# Patient Record
Sex: FEMALE | Race: WHITE | NOT HISPANIC OR LATINO | Employment: FULL TIME | URBAN - METROPOLITAN AREA
[De-identification: names, ages, dates, MRNs, and addresses within clinical notes are randomized per-mention and may not be internally consistent; named-entity substitution may affect disease eponyms.]

---

## 2024-12-16 ENCOUNTER — TELEPHONE (OUTPATIENT)
Dept: URGENT CARE | Facility: CLINIC | Age: 64
End: 2024-12-16
Payer: COMMERCIAL

## 2024-12-16 ENCOUNTER — OFFICE VISIT (OUTPATIENT)
Dept: URGENT CARE | Facility: CLINIC | Age: 64
End: 2024-12-16
Payer: COMMERCIAL

## 2024-12-16 VITALS
BODY MASS INDEX: 24.11 KG/M2 | WEIGHT: 150 LBS | TEMPERATURE: 98.2 F | DIASTOLIC BLOOD PRESSURE: 86 MMHG | HEART RATE: 79 BPM | OXYGEN SATURATION: 97 % | RESPIRATION RATE: 16 BRPM | SYSTOLIC BLOOD PRESSURE: 153 MMHG | HEIGHT: 66 IN

## 2024-12-16 DIAGNOSIS — J01.90 ACUTE RHINOSINUSITIS: Primary | ICD-10-CM

## 2024-12-16 DIAGNOSIS — J02.9 ACUTE PHARYNGITIS, UNSPECIFIED ETIOLOGY: ICD-10-CM

## 2024-12-16 LAB — POC RAPID STREP: NEGATIVE

## 2024-12-16 PROCEDURE — 99212 OFFICE O/P EST SF 10 MIN: CPT | Performed by: PHYSICIAN ASSISTANT

## 2024-12-16 PROCEDURE — 87880 STREP A ASSAY W/OPTIC: CPT | Performed by: PHYSICIAN ASSISTANT

## 2024-12-16 RX ORDER — DEXTROMETHORPHAN HYDROBROMIDE, GUAIFENESIN AND PSEUDOEPHEDRINE HYDROCHLORIDE 15; 400; 60 MG/1; MG/1; MG/1
1 TABLET ORAL 3 TIMES DAILY
Qty: 21 TABLET | Refills: 0 | Status: SHIPPED | OUTPATIENT
Start: 2024-12-16 | End: 2024-12-23

## 2024-12-16 ASSESSMENT — VISUAL ACUITY: OU: 1

## 2024-12-16 NOTE — TELEPHONE ENCOUNTER
Result Communication    No results found from the In Basket message.    5:11 PM      Results were successfully communicated with the patient and they acknowledged their understanding.

## 2024-12-16 NOTE — PROGRESS NOTES
[facility]RG[facility]   RAFAL NOTE:      Name: Tish Cherry, 64 y.o.    CSN:[id number]   MRN:[id number]    PCP: No primary care provider on file.    ALL:  No Known Allergies    History:    Chief Complaint: Sore Throat (Sore throat, and body aches for 2 days. )    Encounter Date: 12/16/2024      HPI: The history was obtained from the patient. Tish is a 64 y.o. female, who presents with a chief complaint of Sore Throat (Sore throat, and body aches for 2 days. ) exposed to strep by her granddaughter approximately a week ago, symptoms have been persistent causing moderate sore throat but she is also complaining of congestion and a cough.  Denies any fevers, exertional dyspnea or hemoptysis.    PMHx:    No past medical history on file.           Current Outpatient Medications   Medication Sig Dispense Refill    pseudoephedrine-DM-guaifenesin (Capmist DM) 60- mg tablet Take 1 tablet by mouth 3 times a day for 7 days. 21 tablet 0     No current facility-administered medications for this visit.         PMSx:  No past surgical history on file.    Fam Hx: No family history on file.    SOC. Hx:     Social History     Socioeconomic History    Marital status:      Spouse name: Not on file    Number of children: Not on file    Years of education: Not on file    Highest education level: Not on file   Occupational History    Not on file   Tobacco Use    Smoking status: Not on file    Smokeless tobacco: Not on file   Substance and Sexual Activity    Alcohol use: Not on file    Drug use: Not on file    Sexual activity: Not on file   Other Topics Concern    Not on file   Social History Narrative    Not on file     Social Drivers of Health     Financial Resource Strain: Not on file   Food Insecurity: Not on file   Transportation Needs: Not on file   Physical Activity: Not on file   Stress: Not on file   Social Connections: Not on file   Intimate Partner Violence: Not on file   Housing Stability: Not  on file         Vitals:    12/16/24 1559   BP: 153/86   Pulse: 79   Resp: 16   Temp: 36.8 °C (98.2 °F)   SpO2: 97%     68 kg (150 lb)          Physical Exam  Vitals reviewed.   Constitutional:       Appearance: Normal appearance. She is normal weight.   HENT:      Head: Normocephalic and atraumatic.      Nose: Nose normal.      Mouth/Throat:      Mouth: Mucous membranes are moist.      Pharynx: Uvula midline. Posterior oropharyngeal erythema present. No oropharyngeal exudate or uvula swelling.      Tonsils: No tonsillar exudate or tonsillar abscesses.   Eyes:      General: Lids are normal. Vision grossly intact.      Extraocular Movements: Extraocular movements intact.   Cardiovascular:      Rate and Rhythm: Normal rate and regular rhythm.   Pulmonary:      Effort: Pulmonary effort is normal.      Breath sounds: Normal breath sounds.   Abdominal:      General: Abdomen is flat.   Musculoskeletal:         General: Normal range of motion.      Cervical back: Normal range of motion and neck supple.   Skin:     General: Skin is warm.      Coloration: Skin is not pale.      Findings: No rash.   Neurological:      Mental Status: She is alert and oriented to person, place, and time.   Psychiatric:         Behavior: Behavior normal.           LABORATORY @ RADIOLOGICAL IMAGING (if done):     Results for orders placed or performed in visit on 12/16/24 (from the past 24 hours)   POCT rapid strep A manually resulted   Result Value Ref Range    POC Rapid Strep Negative Negative       ____________________________________________________________________    I did personally review Tish's past medical history, surgical history, social history, as well as family history (when relevant).  In this case, I also oversaw the her drug management by reviewing her medication list, allergy list, as well as the medications that I prescribed during the UC course and/or recommended as an out-patient (including possible OTC medications such as  acetaminophen, NSAIDs , etc).    After reviewing the items above, I did look at previous medical documentation, such as recent hospitalizations, office visits, and/or recent consultations with PCP/specialist.                          SDOH:   Another factor that I considered in Tish's care was her Social Determinants of Health (SDOH). During this  encounter, she did not have social determinants of health. Those SDOH influencing Tish's care are: none      _____________________________________________________________________      UC COURSE/MEDICAL DECISION MAKING:    Tish is a 64 y.o., who presents with a working diagnosis of   1. Acute rhinosinusitis    2. Acute pharyngitis, unspecified etiology     with a differential to include: Influenza, parainfluenza, rhinovirus, adenovirus, metapneumovirus, coronavirus, COVID-19, postnasal drip, strep pharyngitis, GERD, retropharyngeal abscess, tonsillitis, adenitis, seasonal allergies    Current plan is to provide supportive care, point-of-care testing for strep was negative, she has minimal symptoms in the center criteria of 1-2, would benefit from watchful waiting and if symptoms persist I then can provide additional agents.  She was agreeable discharge        Juan Antonio Perez PA-C   Advanced Practice Provider  [facility]    Please note: While the patient may or may not have received printed discharge paperwork, all relevant medical findings, test results, and treatment details are accessible through the electronic medical record system. The patient is encouraged to review their chart via the patient portal for comprehensive information and follow-up instructions.